# Patient Record
Sex: FEMALE | Race: BLACK OR AFRICAN AMERICAN | NOT HISPANIC OR LATINO | Employment: OTHER | URBAN - METROPOLITAN AREA
[De-identification: names, ages, dates, MRNs, and addresses within clinical notes are randomized per-mention and may not be internally consistent; named-entity substitution may affect disease eponyms.]

---

## 2020-01-01 ENCOUNTER — HOSPITAL ENCOUNTER (EMERGENCY)
Facility: HOSPITAL | Age: 60
End: 2020-12-16
Attending: EMERGENCY MEDICINE
Payer: MEDICAID

## 2020-01-01 VITALS
TEMPERATURE: 91 F | HEART RATE: 47 BPM | SYSTOLIC BLOOD PRESSURE: 125 MMHG | DIASTOLIC BLOOD PRESSURE: 66 MMHG | RESPIRATION RATE: 45 BRPM

## 2020-01-01 DIAGNOSIS — R00.0 TACHYCARDIA: ICD-10-CM

## 2020-01-01 DIAGNOSIS — I46.9 CARDIOPULMONARY ARREST: Primary | ICD-10-CM

## 2020-01-01 LAB
ALBUMIN SERPL BCP-MCNC: 1.1 G/DL (ref 3.5–5.2)
ALP SERPL-CCNC: 195 U/L (ref 55–135)
ALT SERPL W/O P-5'-P-CCNC: 30 U/L (ref 10–44)
ANION GAP SERPL CALC-SCNC: 17 MMOL/L (ref 8–16)
ANISOCYTOSIS BLD QL SMEAR: SLIGHT
APTT BLDCRRT: 74 SEC (ref 21–32)
AST SERPL-CCNC: 93 U/L (ref 10–40)
BASOPHILS # BLD AUTO: ABNORMAL K/UL (ref 0–0.2)
BASOPHILS NFR BLD: 0 % (ref 0–1.9)
BILIRUB SERPL-MCNC: 0.6 MG/DL (ref 0.1–1)
BNP SERPL-MCNC: 4849 PG/ML (ref 0–99)
BUN SERPL-MCNC: 42 MG/DL (ref 6–20)
CALCIUM SERPL-MCNC: 4.7 MG/DL (ref 8.7–10.5)
CHLORIDE SERPL-SCNC: 115 MMOL/L (ref 95–110)
CO2 SERPL-SCNC: 13 MMOL/L (ref 23–29)
CREAT SERPL-MCNC: 2.6 MG/DL (ref 0.5–1.4)
DIFFERENTIAL METHOD: ABNORMAL
EOSINOPHIL # BLD AUTO: ABNORMAL K/UL (ref 0–0.5)
EOSINOPHIL NFR BLD: 0 % (ref 0–8)
ERYTHROCYTE [DISTWIDTH] IN BLOOD BY AUTOMATED COUNT: 22.9 % (ref 11.5–14.5)
EST. GFR  (AFRICAN AMERICAN): 22 ML/MIN/1.73 M^2
EST. GFR  (NON AFRICAN AMERICAN): 19 ML/MIN/1.73 M^2
GLUCOSE SERPL-MCNC: 278 MG/DL (ref 70–110)
HCT VFR BLD AUTO: 25.8 % (ref 37–48.5)
HCV AB SERPL QL IA: NEGATIVE
HGB BLD-MCNC: 6.7 G/DL (ref 12–16)
HIV 1+2 AB+HIV1 P24 AG SERPL QL IA: NEGATIVE
IMM GRANULOCYTES # BLD AUTO: ABNORMAL K/UL (ref 0–0.04)
IMM GRANULOCYTES NFR BLD AUTO: ABNORMAL % (ref 0–0.5)
INR PPP: 4.3 (ref 0.8–1.2)
LACTATE SERPL-SCNC: >12 MMOL/L (ref 0.5–2.2)
LIPASE SERPL-CCNC: 30 U/L (ref 4–60)
LYMPHOCYTES # BLD AUTO: ABNORMAL K/UL (ref 1–4.8)
LYMPHOCYTES NFR BLD: 10 % (ref 18–48)
MAGNESIUM SERPL-MCNC: 1.6 MG/DL (ref 1.6–2.6)
MCH RBC QN AUTO: 28.2 PG (ref 27–31)
MCHC RBC AUTO-ENTMCNC: 26 G/DL (ref 32–36)
MCV RBC AUTO: 108 FL (ref 82–98)
METAMYELOCYTES NFR BLD MANUAL: 3 %
MONOCYTES # BLD AUTO: ABNORMAL K/UL (ref 0.3–1)
MONOCYTES NFR BLD: 1 % (ref 4–15)
MYELOCYTES NFR BLD MANUAL: 1 %
NEUTROPHILS NFR BLD: 83 % (ref 38–73)
NEUTS BAND NFR BLD MANUAL: 2 %
NRBC BLD-RTO: 1 /100 WBC
OVALOCYTES BLD QL SMEAR: ABNORMAL
PHOSPHATE SERPL-MCNC: 3.5 MG/DL (ref 2.7–4.5)
PLATELET # BLD AUTO: 52 K/UL (ref 150–350)
PLATELET BLD QL SMEAR: ABNORMAL
PMV BLD AUTO: 14.2 FL (ref 9.2–12.9)
POCT GLUCOSE: >500 MG/DL (ref 70–110)
POCT GLUCOSE: >500 MG/DL (ref 70–110)
POIKILOCYTOSIS BLD QL SMEAR: SLIGHT
POTASSIUM SERPL-SCNC: 3.6 MMOL/L (ref 3.5–5.1)
PROT SERPL-MCNC: 2.5 G/DL (ref 6–8.4)
PROTHROMBIN TIME: 42.6 SEC (ref 9–12.5)
RBC # BLD AUTO: 2.38 M/UL (ref 4–5.4)
SARS-COV-2 RDRP RESP QL NAA+PROBE: POSITIVE
SODIUM SERPL-SCNC: 145 MMOL/L (ref 136–145)
TROPONIN I SERPL DL<=0.01 NG/ML-MCNC: 0.53 NG/ML (ref 0–0.03)
WBC # BLD AUTO: 12.85 K/UL (ref 3.9–12.7)

## 2020-01-01 PROCEDURE — 63600175 PHARM REV CODE 636 W HCPCS

## 2020-01-01 PROCEDURE — 84100 ASSAY OF PHOSPHORUS: CPT

## 2020-01-01 PROCEDURE — 25000003 PHARM REV CODE 250

## 2020-01-01 PROCEDURE — 83880 ASSAY OF NATRIURETIC PEPTIDE: CPT

## 2020-01-01 PROCEDURE — 82962 GLUCOSE BLOOD TEST: CPT

## 2020-01-01 PROCEDURE — 25000003 PHARM REV CODE 250: Performed by: EMERGENCY MEDICINE

## 2020-01-01 PROCEDURE — 96375 TX/PRO/DX INJ NEW DRUG ADDON: CPT

## 2020-01-01 PROCEDURE — 99291 CRITICAL CARE FIRST HOUR: CPT | Mod: 25

## 2020-01-01 PROCEDURE — 85027 COMPLETE CBC AUTOMATED: CPT

## 2020-01-01 PROCEDURE — 83735 ASSAY OF MAGNESIUM: CPT

## 2020-01-01 PROCEDURE — 85610 PROTHROMBIN TIME: CPT

## 2020-01-01 PROCEDURE — 85730 THROMBOPLASTIN TIME PARTIAL: CPT

## 2020-01-01 PROCEDURE — 96374 THER/PROPH/DIAG INJ IV PUSH: CPT

## 2020-01-01 PROCEDURE — 85007 BL SMEAR W/DIFF WBC COUNT: CPT

## 2020-01-01 PROCEDURE — 83605 ASSAY OF LACTIC ACID: CPT

## 2020-01-01 PROCEDURE — 86703 HIV-1/HIV-2 1 RESULT ANTBDY: CPT

## 2020-01-01 PROCEDURE — 83690 ASSAY OF LIPASE: CPT

## 2020-01-01 PROCEDURE — 63600175 PHARM REV CODE 636 W HCPCS: Performed by: EMERGENCY MEDICINE

## 2020-01-01 PROCEDURE — 86803 HEPATITIS C AB TEST: CPT

## 2020-01-01 PROCEDURE — 80053 COMPREHEN METABOLIC PANEL: CPT

## 2020-01-01 PROCEDURE — 84484 ASSAY OF TROPONIN QUANT: CPT

## 2020-01-01 PROCEDURE — U0002 COVID-19 LAB TEST NON-CDC: HCPCS

## 2020-01-01 RX ORDER — EPINEPHRINE 0.1 MG/ML
INJECTION INTRAVENOUS CODE/TRAUMA/SEDATION MEDICATION
Status: COMPLETED | OUTPATIENT
Start: 2020-01-01 | End: 2020-01-01

## 2020-01-01 RX ORDER — ETOMIDATE 2 MG/ML
20 INJECTION INTRAVENOUS
Status: DISCONTINUED | OUTPATIENT
Start: 2020-01-01 | End: 2020-01-01 | Stop reason: HOSPADM

## 2020-01-01 RX ORDER — SODIUM BICARBONATE 1 MEQ/ML
SYRINGE (ML) INTRAVENOUS CODE/TRAUMA/SEDATION MEDICATION
Status: COMPLETED | OUTPATIENT
Start: 2020-01-01 | End: 2020-01-01

## 2020-01-01 RX ORDER — ROCURONIUM BROMIDE 10 MG/ML
10 INJECTION, SOLUTION INTRAVENOUS ONCE
Status: DISCONTINUED | OUTPATIENT
Start: 2020-01-01 | End: 2020-01-01 | Stop reason: HOSPADM

## 2020-01-01 RX ORDER — DEXTROSE MONOHYDRATE 100 MG/ML
INJECTION, SOLUTION INTRAVENOUS
Status: COMPLETED | OUTPATIENT
Start: 2020-01-01 | End: 2020-01-01

## 2020-01-01 RX ORDER — PANTOPRAZOLE SODIUM 40 MG/10ML
80 INJECTION, POWDER, LYOPHILIZED, FOR SOLUTION INTRAVENOUS
Status: DISCONTINUED | OUTPATIENT
Start: 2020-01-01 | End: 2020-01-01 | Stop reason: HOSPADM

## 2020-01-01 RX ADMIN — DEXTROSE MONOHYDRATE 25 G: 25 INJECTION, SOLUTION INTRAVENOUS at 12:12

## 2020-01-01 RX ADMIN — SODIUM BICARBONATE 50 MEQ: 84 INJECTION, SOLUTION INTRAVENOUS at 12:12

## 2020-01-01 RX ADMIN — DEXTROSE MONOHYDRATE 25 G: 25 INJECTION, SOLUTION INTRAVENOUS at 01:12

## 2020-01-01 RX ADMIN — EPINEPHRINE 1 MG: 0.1 INJECTION, SOLUTION ENDOTRACHEAL; INTRACARDIAC; INTRAVENOUS at 12:12

## 2020-01-01 RX ADMIN — DEXTROSE MONOHYDRATE 500 ML: 10 INJECTION, SOLUTION INTRAVENOUS at 12:12

## 2020-12-16 NOTE — ED PROVIDER NOTES
SCRIBE #1 NOTE: I, Christine Rodriguez, am scribing for, and in the presence of, Kimmie Turk Do, MD. I have scribed the entire note.      History      Chief Complaint   Patient presents with    Cardiac Arrest       Review of patient's allergies indicates:   Allergen Reactions    Lisinopril Swelling        HPI   HPI    12/16/2020, 1:50 PM   History obtained from the [facility]      History of Present Illness: Vijaya David is a 60 y.o. female patient who presents to the Emergency Department for evaluation s/p cardiac arrest. Patient was reportedly getting ready for dialysis this morning when her blood sugar was checked and reported at 31. She was then given glucagon and re-checked on when nursing home staff found the patient down. [facility] No further complaints or concerns at this time.     Intubated with lawanda tube by Jamila,  Given Glucacoon and a IO line.  Lost pulse in route, ACLS was started and given epi and ROS.  Here know. EMS says glucose still low.    PCP: Kurtis Vital MD     Past Medical History:  History reviewed. No pertinent medical history.    Past Surgical History:  History reviewed. No pertinent surgical history.    Family History:  History reviewed. No pertinent family history.    Social History:  Social History Main Topics    Smoking status: Unknown if ever smoked    Smokeless tobacco: Unknown if ever used    Alcohol Use: Unknown drinking history    Drug Use: Unknown if ever used    Sexual Activity: Unknown       ROS   Review of Systems   Unable to perform ROS: Patient unresponsive (Cardiac arrest)     Physical Exam      Initial Vitals   BP Pulse Resp Temp SpO2   12/16/20 1314 12/16/20 1254 12/16/20 1314 12/16/20 1314 --   125/66 100 (!) 45 (!) 90.5 °F (32.5 °C)       MAP       --                 Physical Exam  Nursing Notes and Vital Signs Reviewed.  Physical exam is limited due to the patient's condition.   General: Patient is unresponsive. She is in a diaper. Pt intubated and blood is in tube  Head:  Atraumatic   Eyes: Pupils fixed  ENT: Airway patent. Intubated with a lawanda tube, with blood coming out.   Cardiovascular: No palpable pulses.  Respiratory: No spontaneous respiration. 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  Skin: Multiple excoriations noted on the body,   Neurological: Full neuro exam limited due to patient's condition      ED Course    Central Line    Date/Time: 12/16/2020 3:25 PM  Performed by: Kimmie Turk Do, MD  Authorized by: Kimmie Turk Do, MD     Location procedure was performed:  [facility] EMERGENCY DEPARTMENT  Consent Done ?:  Emergent Situation  Time out complete?: Verified correct patient, procedure, equipment, staff, and site/side    Indications:  Hemodynamic monitoring  Description of findings:  Cardiac arrest   Preparation:  Skin prepped with ChloraPrep  Skin prep agent dried: Skin prep agent completely dried prior to procedure    Sterile barriers: All five maximal sterile barriers used - gloves, gown, cap, mask and large sterile sheet    Hand hygiene: Hand hygiene performed immediately prior to central venous catheter insertion    Location:  Left femoral  Site selection rationale:  Femoral due to compressions   Catheter type:  Triple lumen  Catheter size:  7 Fr  Ultrasound guidance: No    Manometry: No    Number of attempts:  1  Securement:  Line sutured, blood return through all ports and sterile dressing applied  Complications: No      Critical Care    Date/Time: 12/16/2020 3:35 PM  Performed by: Kimmie Turk Do, MD  Authorized by: Kimmie Turk Do, MD   Direct patient critical care time: 10 minutes  Additional history critical care time: 6 minutes  Ordering / reviewing critical care time: 5 minutes  Documentation critical care time: 7 minutes  Consult with family critical care time: 10 minutes  Total critical care time (exclusive of procedural time) : 38 minutes  Critical care time was  exclusive of separately billable procedures and treating other patients.  Critical care was necessary to treat or prevent imminent or life-threatening deterioration of the following conditions: cardiac failure.  Critical care was time spent personally by me on the following activities: blood draw for specimens, interpretation of cardiac output measurements, evaluation of patient's response to treatment, examination of patient, obtaining history from patient or surrogate, ordering and performing treatments and interventions, pulse oximetry, re-evaluation of patient's condition and review of old charts.        ED Vital Signs:  Vitals:    12/16/20 1254 12/16/20 1314 12/16/20 1315   BP:  125/66    Pulse: 100 (!) 56 (!) 47   Resp:  (!) 45    Temp:  (!) 90.5 °F (32.5 °C)    TempSrc:  Core Rectal        Abnormal Lab Results:  Labs Reviewed   CBC W/ AUTO DIFFERENTIAL - Abnormal; Notable for the following components:       Result Value    WBC 12.85 (*)     RBC 2.38 (*)     Hemoglobin 6.7 (*)     Hematocrit 25.8 (*)      (*)     MCHC 26.0 (*)     RDW 22.9 (*)     Platelets 52 (*)     MPV 14.2 (*)     nRBC 1 (*)     Gran % 83.0 (*)     Lymph % 10.0 (*)     Mono % 1.0 (*)     All other components within normal limits   COMPREHENSIVE METABOLIC PANEL - Abnormal; Notable for the following components:    Chloride 115 (*)     CO2 13 (*)     Glucose 278 (*)     BUN 42 (*)     Creatinine 2.6 (*)     Calcium 4.7 (*)     Total Protein 2.5 (*)     Albumin 1.1 (*)     Alkaline Phosphatase 195 (*)     AST 93 (*)     Anion Gap 17 (*)     eGFR if  22 (*)     eGFR if non  19 (*)     All other components within normal limits    Narrative:      CALCIUM  critical result(s) called and verbal readback obtained from   MELANY GAUTAM RN by STEFANIE 12/16/2020 14:24   LACTIC ACID, PLASMA - Abnormal; Notable for the following components:    Lactate (Lactic Acid) >12.0 (*)     All other components within normal  limits    Narrative:      LACTIC ACID  critical result(s) called and verbal readback obtained   from MELANY GAUTAM RN by STEFANIE 12/16/2020 14:25   APTT - Abnormal; Notable for the following components:    aPTT 74.0 (*)     All other components within normal limits   PROTIME-INR - Abnormal; Notable for the following components:    Prothrombin Time 42.6 (*)     INR 4.3 (*)     All other components within normal limits   B-TYPE NATRIURETIC PEPTIDE - Abnormal; Notable for the following components:    BNP 4,849 (*)     All other components within normal limits   TROPONIN I - Abnormal; Notable for the following components:    Troponin I 0.531 (*)     All other components within normal limits   SARS-COV-2 RNA AMPLIFICATION, QUAL - Abnormal; Notable for the following components:    SARS-CoV-2 RNA, Amplification, Qual Positive (*)     All other components within normal limits   POCT GLUCOSE - Abnormal; Notable for the following components:    POCT Glucose >500 (*)     All other components within normal limits   POCT GLUCOSE - Abnormal; Notable for the following components:    POCT Glucose >500 (*)     All other components within normal limits   LIPASE   PHOSPHORUS   MAGNESIUM   HIV 1 / 2 ANTIBODY   HEPATITIS C ANTIBODY        All Lab Results:  Results for orders placed or performed during the hospital encounter of 12/16/20   CBC auto differential   Result Value Ref Range    WBC 12.85 (H) 3.90 - 12.70 K/uL    RBC 2.38 (L) 4.00 - 5.40 M/uL    Hemoglobin 6.7 (L) 12.0 - 16.0 g/dL    Hematocrit 25.8 (L) 37.0 - 48.5 %     (H) 82 - 98 fL    MCH 28.2 27.0 - 31.0 pg    MCHC 26.0 (L) 32.0 - 36.0 g/dL    RDW 22.9 (H) 11.5 - 14.5 %    Platelets 52 (L) 150 - 350 K/uL    MPV 14.2 (H) 9.2 - 12.9 fL    Immature Granulocytes CANCELED 0.0 - 0.5 %    Immature Grans (Abs) CANCELED 0.00 - 0.04 K/uL    Lymph # CANCELED 1.0 - 4.8 K/uL    Mono # CANCELED 0.3 - 1.0 K/uL    Eos # CANCELED 0.0 - 0.5 K/uL    Baso # CANCELED 0.00 - 0.20 K/uL     nRBC 1 (A) 0 /100 WBC    Gran % 83.0 (H) 38.0 - 73.0 %    Lymph % 10.0 (L) 18.0 - 48.0 %    Mono % 1.0 (L) 4.0 - 15.0 %    Eosinophil % 0.0 0.0 - 8.0 %    Basophil % 0.0 0.0 - 1.9 %    Bands 2.0 %    Metamyelocytes 3.0 %    Myelocytes 1.0 %    Platelet Estimate Appears normal     Aniso Slight     Poik Slight     Ovalocytes Occasional     Differential Method Manual    Comprehensive metabolic panel   Result Value Ref Range    Sodium 145 136 - 145 mmol/L    Potassium 3.6 3.5 - 5.1 mmol/L    Chloride 115 (H) 95 - 110 mmol/L    CO2 13 (L) 23 - 29 mmol/L    Glucose 278 (H) 70 - 110 mg/dL    BUN 42 (H) 6 - 20 mg/dL    Creatinine 2.6 (H) 0.5 - 1.4 mg/dL    Calcium 4.7 (LL) 8.7 - 10.5 mg/dL    Total Protein 2.5 (L) 6.0 - 8.4 g/dL    Albumin 1.1 (L) 3.5 - 5.2 g/dL    Total Bilirubin 0.6 0.1 - 1.0 mg/dL    Alkaline Phosphatase 195 (H) 55 - 135 U/L    AST 93 (H) 10 - 40 U/L    ALT 30 10 - 44 U/L    Anion Gap 17 (H) 8 - 16 mmol/L    eGFR if African American 22 (A) >60 mL/min/1.73 m^2    eGFR if non African American 19 (A) >60 mL/min/1.73 m^2   Lactic acid, plasma #1   Result Value Ref Range    Lactate (Lactic Acid) >12.0 (HH) 0.5 - 2.2 mmol/L   APTT   Result Value Ref Range    aPTT 74.0 (H) 21.0 - 32.0 sec   Protime-INR   Result Value Ref Range    Prothrombin Time 42.6 (H) 9.0 - 12.5 sec    INR 4.3 (H) 0.8 - 1.2   Brain natriuretic peptide   Result Value Ref Range    BNP 4,849 (H) 0 - 99 pg/mL   Troponin I   Result Value Ref Range    Troponin I 0.531 (H) 0.000 - 0.026 ng/mL   Lipase   Result Value Ref Range    Lipase 30 4 - 60 U/L   Phosphorus   Result Value Ref Range    Phosphorus 3.5 2.7 - 4.5 mg/dL   Magnesium   Result Value Ref Range    Magnesium 1.6 1.6 - 2.6 mg/dL   COVID-19 Rapid Screening   Result Value Ref Range    SARS-CoV-2 RNA, Amplification, Qual Positive (A) Negative   POCT glucose   Result Value Ref Range    POCT Glucose >500 (H) 70 - 110 mg/dL   POCT glucose   Result Value Ref Range    POCT Glucose >500 (H) 70 -  110 mg/dL         Imaging Results:  Imaging Results    None             The EKG was ordered, reviewed, and independently interpreted by the ED provider.    The Emergency Provider reviewed the vital signs and test results, which are outlined above.    ED Discussion     12:45 PM: Patient arrives in the ED via [facility] s/p cardiac arrest.     12:47 PM: 1 amp of D50 pushed at this time.    12:48: Pulse check. No palpable pulse. Initiated chest compressions.    12:48 PM: 1 amp of D50 pushed.     12:49 PM: Pulse check. No palpable pulse. Compressions continued.     12:52 PM: Bicarb pushed at this time. Epi pushed at this time.     12:54 PM: Pulse check. Palpable pulse noted at the Left femoral.    1:16 PM: Attempted resuscitation. Patient only had IO. A central line was placed. Cardiac US, there was only flickering of the valves and minimal cardiac activity, no true contractions. Gave patient epi and D50 to increase glucose. Despite all measures, pt has not return of spontaneous circulation.  Please see nurse's note TOD called at this time please see nurses note.      I did talk to daughter and mom about case extensively.            ED Medication(s):  Medications   etomidate injection 20 mg (20 mg Intravenous Not Given 12/16/20 1321)   rocuronium injection 10 mg (10 mg Intravenous Not Given 12/16/20 1321)   pantoprazole injection 80 mg (80 mg Intravenous Not Given 12/16/20 1321)   dextrose 50% injection (25 g Intravenous Given 12/16/20 1248)   EPINEPHrine 0.1 mg/mL injection (1 mg Intravenous Given 12/16/20 1251)   sodium bicarbonate 8.4 % (1 mEq/mL) injection (50 mEq Intravenous Given 12/16/20 1252)   dextrose 10 % infusion ( Intravenous Stopped 12/16/20 1305)   dextrose 50% injection ( Intravenous Canceled Entry 12/16/20 1315)             Medical Decision Making              Scribe Attestation:   Scribe #1: I performed the above scribed service and the documentation accurately describes the services I performed. I attest to  the accuracy of the note.    Attending:   Physician Attestation Statement for Scribe #1: I, Kimmie Turk Do, MD, personally performed the services described in this documentation, as scribed by Christine Rodriguez, in my presence, and it is both accurate and complete.          Clinical Impression       ICD-10-CM ICD-9-CM   1. Cardiopulmonary arrest  I46.9 427.5   2. Tachycardia  R00.0 785.0       Disposition:   Disposition:          Kimmie Turk Do, MD  [id number]

## 2020-12-16 NOTE — ED NOTES
[employer] notified that pt has been released. Family has viewed body at this time. [place] [facility] [place]

## 2020-12-16 NOTE — CODE/ RAPID DOCUMENTATION
Pt arrives to the ED with [facility] and [facility]. [facility] reports patient was checked on at 10 am and was altered, they checked her glucose and was in the 30's. They gave her IM glucagon. Recheck remained in the 30's. They gave another IM glucagon dose and called 911. [facility] reports on their arrival patient was pulseless. CPR was initiated, intubated with a Leo Tube, IO to the left tibia, received 2 Epi and 2 D50 at [facility] with ROSC. In route patient went into pulseless VTach and was defibrillated. Again were able to get ROSC. [facility] arrives bagging patient with BVM. Patient is a dialysis patient with a shunt in the LUE, was due to for dialysis today.

## 2020-12-16 NOTE — PROGRESS NOTES
RT called to room for cardiac arrest. Pt was bagged via lawanda tube with 100% fio2. Prepared for intubation but pt never intubated.  Pt lost pulse and cardiac activity evaluated per ultrasound. Code called per Dr. Burks.

## 2020-12-16 NOTE — CODE/ RAPID DOCUMENTATION
Multiple excoriations and ulcers on bilateral legs and upper extremities. Nonblanchable purpura noted. Dried blood along right lateral leg.

## 2020-12-16 NOTE — CHAPLAIN
Provided support to pt's family through presence and prayer after she passed.    Chaplain Magen Wiggins M.Div., [facility]

## 2020-12-16 NOTE — CODE/ RAPID DOCUMENTATION
Dr. De La Cruz updated patient's daughter and mother on patient's passing. Updated on plans to contact 's office for clearance prior to viewing patient. They will discuss with family regarding  home arrangements.
